# Patient Record
Sex: FEMALE | Race: BLACK OR AFRICAN AMERICAN | Employment: PART TIME | URBAN - METROPOLITAN AREA
[De-identification: names, ages, dates, MRNs, and addresses within clinical notes are randomized per-mention and may not be internally consistent; named-entity substitution may affect disease eponyms.]

---

## 2019-02-18 ENCOUNTER — HOSPITAL ENCOUNTER (OUTPATIENT)
Dept: GENERAL RADIOLOGY | Age: 64
Discharge: HOME | End: 2019-02-18
Payer: MEDICARE

## 2019-02-18 DIAGNOSIS — Z78.0 POST-MENOPAUSAL: ICD-10-CM

## 2019-02-18 PROCEDURE — 77080 DXA BONE DENSITY AXIAL: CPT

## 2020-02-25 ENCOUNTER — HOSPITAL ENCOUNTER (OUTPATIENT)
Dept: NUTRITION | Age: 65
Discharge: HOME | End: 2020-02-25
Payer: MEDICARE

## 2020-02-25 PROCEDURE — 97802 MEDICAL NUTRITION INDIV IN: CPT

## 2020-02-25 NOTE — PROGRESS NOTES
[facility] - Outpatient Nutrition Services  [id number] [place] [address], [place] [address]  Phone: [phone] Fax: [phone]   Nutrition Assessment - Medical Nutrition Therapy   Outpatient Initial Evaluation         Patient Name: Ron Mukherjee : 1955   Treatment Diagnosis: DM2   Referral Source: Jennyfer Velasquez Start of Care [facility]): 2020     Gender: female Age: 75 y.o. Ht: 70 in Wt:  250 lb  kg   BMI: 35.9 BMR   Male  BMR Female 2315     Past Medical History:  High BP, DM, HLD     Pertinent Medications:   D, K, BP and cholesterol meds   Biochemical Data:   See paper chart     Assessment:   Pt in today on request. She has had many family issues in her recent past that have led to weight gain. Pt goes back and forth to [place] (2.5 hours) every other weekend to help care for her sister. Pt works as a caterer PT. She currently lives with her daughter Stefanie Calhoun and they share cooking responsibilities. Pt walks for exercise 5 days per week when the weather permits (usually not often in the winter). She does not test BS at home. Food & Nutrition: Pt currently eats 3 meals/day in addition to snacks and dessert. Pt typically drinks Mt. Dew (up to 5 cans/day), water (2 bottles), and fruit juice occasionally.   B: 2 eggs with grits, turkey cohen, and coffee with cream and sweetener (not sugar) or Frosted Flakes or waffle with turkey cohen or applesauce on days she is off work   L: chicken wings or pizza or salad or turkey sandwich with fries, or Castaneda's combo meal  D: salmon, grilled or fried chicken, hot dogs, or steak, with potatoes and veggies or fast food meal at times  Sn: chips, cookies, or candy bar  José Manuel: cheesecake with strawberries or mini blizzard from DQ     Estimated Needs   Kcals/  1600 Protein: 100 Carbs: 180 Fat: 53   day  g/day  g/day  g/day        percent: 25  45  30               Nutrition Diagnosis PES: Alt nutrition related lab values r/t dx of DM2 aeb A1C of 6. Obesity r/t excessive energy intake aeb BMI of 35.9. Nutrition Intervention &  Education: Discussed the Healthy Plate Method and appropriate portion sizes of different food groups. Explained the importance of combining carbohydrates with protein at meals and reviewed foods that contain each nutrient. Emphasized the importance of consistent meal time intervals throughout the day to improve metabolism. Explained calorie density and empty calories to assist pt with understanding portion sizes and limiting excess calories. Educated pt on the pathophysiology of Type II Diabetes and insulin resistance and the rationale for dietary modification and increased activity. Educated pt on all carbohydrates found in foods and encouraged no more than 40-50 g/meal and 15-20 g/snack. Decrease or replace sugary beverages. Handouts Provided: [x]  Carbohydrates  [x]  Protein  []  Non-starchy Vegatbles  []  Food Label  []  Meal and Snack Ideas  []  Food Journals [x]  Diabetes  []  Cholesterol  []  Sodium  []  Gen Nutr Guidelines  []  SBGM Guidelines  [x]  Others: snacks, non-starchy veggies   Information Reviewed with: Pt   Readiness to Change Stage: []  Pre-contemplative    []  Contemplative  [x]  Preparation               []  Action                  []  Maintenance   Potential Barriers to Learning: []  Decline in memory    []  Language barrier   []  Other:  []  Emotional                  []  Limited mobility  []  Lack of motivation     [] Vision, hearing or cognitive impairment   Expected Compliance: Fair      Nutritional Goal - To promote lifestyle changes to result in:    [x]  Weight loss  [x]  Improved diabetic control   [x]  Decreased cholesterol levels  [x]  Decreased blood pressure  []  Weight maintenance []  Preventing any interactions associated with food allergies  []  Adequate weight gain toward goal weight  []  Other:        Patient Goals:   1. Limit CHO's to 40-50 g/meal and 15-20 g/snack  2.  Have at least 1 serving of protein each time you eat (balanced meals)  3. Aim to decrease Mt.  Dew to 2 cans/day (1 with lunch and 1 with dinner)     Dietitian Signature: Beatriz Parekh MS, PARVEZ Date: 2/25/2020   Follow-up: 3/23/20 @ 10:30 Time: 11:36 AM

## 2024-04-23 ENCOUNTER — OFFICE VISIT (OUTPATIENT)
Age: 69
End: 2024-04-23

## 2024-04-23 VITALS — WEIGHT: 237 LBS | HEIGHT: 70 IN | BODY MASS INDEX: 33.93 KG/M2

## 2024-04-23 DIAGNOSIS — I10 PRIMARY HYPERTENSION: ICD-10-CM

## 2024-04-23 DIAGNOSIS — M54.16 LUMBAR BACK PAIN WITH RADICULOPATHY AFFECTING RIGHT LOWER EXTREMITY: ICD-10-CM

## 2024-04-23 DIAGNOSIS — M16.11 PRIMARY OSTEOARTHRITIS OF RIGHT HIP: Primary | ICD-10-CM

## 2024-04-23 RX ORDER — MELOXICAM 15 MG/1
15 TABLET ORAL DAILY
Qty: 30 TABLET | Refills: 2 | Status: SHIPPED | OUTPATIENT
Start: 2024-04-23 | End: 2024-07-22

## 2024-04-23 NOTE — PROGRESS NOTES
meloxicam (MOBIC) 15 MG tablet Take 1 tablet by mouth daily 30 tablet 2    amLODIPine (NORVASC) 2.5 MG tablet Take by mouth daily      aspirin 81 MG EC tablet Take by mouth daily      ergocalciferol (ERGOCALCIFEROL) 1.25 MG (45081 UT) capsule Take 50,000 Units by mouth      simvastatin (ZOCOR) 40 MG tablet Take by mouth       No current facility-administered medications for this visit.        Allergies   Allergen Reactions    Amoxicillin Other (See Comments)     Yeast infection    Erythromycin Other (See Comments)     dizziness       No past surgical history on file.    Social History     Socioeconomic History    Marital status:      Spouse name: Not on file    Number of children: Not on file    Years of education: Not on file    Highest education level: Not on file   Occupational History    Not on file   Tobacco Use    Smoking status: Never    Smokeless tobacco: Not on file   Substance and Sexual Activity    Alcohol use: No    Drug use: Not on file    Sexual activity: Not on file   Other Topics Concern    Not on file   Social History Narrative    Not on file     Social Determinants of Health     Financial Resource Strain: Not on file   Food Insecurity: Not on file   Transportation Needs: Not on file   Physical Activity: Not on file   Stress: Not on file   Social Connections: Not on file   Intimate Partner Violence: Not on file   Housing Stability: Not on file       REVIEW OF SYSTEMS:      Negative except for that stated above.     [unfilled]      Prescription medication management discussed with patient.     Electronically signed by: Shaneka Colon PA-C    Note: This note was completed using voice recognition software.  Any typographical/name errors or mistakes are unintentional.

## 2024-06-19 ENCOUNTER — OFFICE VISIT (OUTPATIENT)
Age: 69
End: 2024-06-19
Payer: MEDICARE

## 2024-06-19 VITALS
TEMPERATURE: 96.8 F | DIASTOLIC BLOOD PRESSURE: 74 MMHG | SYSTOLIC BLOOD PRESSURE: 123 MMHG | WEIGHT: 221.6 LBS | HEART RATE: 86 BPM | BODY MASS INDEX: 31.73 KG/M2 | OXYGEN SATURATION: 96 % | HEIGHT: 70 IN

## 2024-06-19 DIAGNOSIS — M47.816 LUMBAR FACET ARTHROPATHY: ICD-10-CM

## 2024-06-19 DIAGNOSIS — M51.26 HNP (HERNIATED NUCLEUS PULPOSUS), LUMBAR: ICD-10-CM

## 2024-06-19 DIAGNOSIS — M48.062 LUMBAR STENOSIS WITH NEUROGENIC CLAUDICATION: Primary | ICD-10-CM

## 2024-06-19 PROCEDURE — 99203 OFFICE O/P NEW LOW 30 MIN: CPT | Performed by: NURSE PRACTITIONER

## 2024-06-19 PROCEDURE — G8399 PT W/DXA RESULTS DOCUMENT: HCPCS | Performed by: NURSE PRACTITIONER

## 2024-06-19 PROCEDURE — 3017F COLORECTAL CA SCREEN DOC REV: CPT | Performed by: NURSE PRACTITIONER

## 2024-06-19 PROCEDURE — 1036F TOBACCO NON-USER: CPT | Performed by: NURSE PRACTITIONER

## 2024-06-19 PROCEDURE — G8427 DOCREV CUR MEDS BY ELIG CLIN: HCPCS | Performed by: NURSE PRACTITIONER

## 2024-06-19 PROCEDURE — 1123F ACP DISCUSS/DSCN MKR DOCD: CPT | Performed by: NURSE PRACTITIONER

## 2024-06-19 PROCEDURE — G8417 CALC BMI ABV UP PARAM F/U: HCPCS | Performed by: NURSE PRACTITIONER

## 2024-06-19 PROCEDURE — 1090F PRES/ABSN URINE INCON ASSESS: CPT | Performed by: NURSE PRACTITIONER

## 2024-06-19 RX ORDER — MONTELUKAST SODIUM 10 MG/1
10 TABLET ORAL NIGHTLY
COMMUNITY

## 2024-06-19 RX ORDER — HYDROCHLOROTHIAZIDE 25 MG/1
25 TABLET ORAL DAILY
COMMUNITY

## 2024-06-19 RX ORDER — GABAPENTIN 100 MG/1
100 CAPSULE ORAL
COMMUNITY

## 2024-06-19 RX ORDER — POTASSIUM CHLORIDE 750 MG/1
10 TABLET, EXTENDED RELEASE ORAL DAILY
COMMUNITY

## 2024-06-19 RX ORDER — LORATADINE 10 MG/1
10 CAPSULE, LIQUID FILLED ORAL DAILY
COMMUNITY

## 2024-06-19 RX ORDER — DAPAGLIFLOZIN 10 MG/1
10 TABLET, FILM COATED ORAL EVERY MORNING
COMMUNITY

## 2024-06-19 NOTE — PROGRESS NOTES
Joselin Reyes presents today for   Chief Complaint   Patient presents with    Back Pain     LBP   Right hip, RLE pain to foot       Is someone accompanying this pt? NO    Is the patient using any DME equipment during OV? Yes, Walker          Coordination of Care:  1. Have you been to the ER, urgent care clinic since your last visit? No  Hospitalized since your last visit? No    2. Have you seen or consulted any other health care providers outside of the [facility] since your last visit? No Include any pap smears or colon screening. No

## 2024-06-19 NOTE — PROGRESS NOTES
Chief complaint   Chief Complaint   Patient presents with    Back Pain     LBP   Right hip, RLE pain to foot       History of Present Illness:  Joselin Reyes is a  68 y.o.  female who comes in today as a new patient referred by Ortho.  She states she has right-sided back pain with right buttock pain that radiates down the left foot for about a month now.  She denies any injury.  She gets numbness in that foot.  She describes the pain as sharp and shooting and stabbing.  She has tried Mobic but it gave her nausea and it did not help.  She has she has tried Lidoderm patches but that also did not help.  She is on gabapentin 100 mg 3 times a day through her PCP.  She had a MRI of the lumbar spine May 5, 2024 that showed posterior central disc extrusion with facet spurring at L4-5 and L5-S1.  At L5-S1 she had moderate right foraminal narrowing and more severe on the left.  She is diabetic and states her last A1c was 6.0.  She denies fever bowel bladder dysfunction.      Past Medical History:    Asthma, hyperlipidemia, depression and anxiety, GERD, diabetes, right breast cancer status post right lumpectomy and radiation    Past Surgical History:    Right lumpectomy, bilateral tubal ligation      Physical Exam: Patient is a 68-year-old female well-developed well-nourished who is alert and oriented with a normal mood and affect.  She has a full weightbearing slow but nonantalgic gait utilizing a rolling walker.  She has 4 out of 5 strength bilateral lower extremities.  Negative straight leg raise.  She has pain with hyperextension lumbar spine.      Assessment and Plan: This is a patient who has disc extrusion with some stenosis.  I will set her up for a right L5, S1 selective nerve root block.  She will continue her gabapentin through her PCP.  We will see her back after the block.      Joselin was seen today for back pain.    Diagnoses and all orders for this visit:    Lumbar stenosis with neurogenic claudication  -

## 2024-07-02 ENCOUNTER — HOSPITAL ENCOUNTER (OUTPATIENT)
Facility: HOSPITAL | Age: 69
Discharge: HOME | End: 2024-07-05
Payer: MEDICARE

## 2024-07-02 VITALS
SYSTOLIC BLOOD PRESSURE: 116 MMHG | DIASTOLIC BLOOD PRESSURE: 70 MMHG | HEART RATE: 81 BPM | OXYGEN SATURATION: 100 % | TEMPERATURE: 98.1 F | RESPIRATION RATE: 16 BRPM

## 2024-07-02 LAB — GLUCOSE BLD STRIP.AUTO-MCNC: 186 MG/DL (ref 70–110)

## 2024-07-02 PROCEDURE — 6360000002 HC RX W HCPCS: Performed by: PHYSICAL MEDICINE & REHABILITATION

## 2024-07-02 PROCEDURE — 64484 NJX AA&/STRD TFRM EPI L/S EA: CPT | Performed by: PHYSICAL MEDICINE & REHABILITATION

## 2024-07-02 PROCEDURE — 82962 GLUCOSE BLOOD TEST: CPT

## 2024-07-02 PROCEDURE — 2500000003 HC RX 250 WO HCPCS: Performed by: PHYSICAL MEDICINE & REHABILITATION

## 2024-07-02 PROCEDURE — 64483 NJX AA&/STRD TFRM EPI L/S 1: CPT | Performed by: PHYSICAL MEDICINE & REHABILITATION

## 2024-07-02 PROCEDURE — 64483 NJX AA&/STRD TFRM EPI L/S 1: CPT

## 2024-07-02 PROCEDURE — 64484 NJX AA&/STRD TFRM EPI L/S EA: CPT

## 2024-07-02 PROCEDURE — 6360000004 HC RX CONTRAST MEDICATION: Performed by: PHYSICAL MEDICINE & REHABILITATION

## 2024-07-02 RX ORDER — DULAGLUTIDE 3 MG/.5ML
3 INJECTION, SOLUTION SUBCUTANEOUS WEEKLY
COMMUNITY

## 2024-07-02 RX ORDER — DIAZEPAM 5 MG/1
2.5 TABLET ORAL ONCE
Status: DISCONTINUED | OUTPATIENT
Start: 2024-07-02 | End: 2024-07-06 | Stop reason: HOSPADM

## 2024-07-02 RX ORDER — BUPROPION HYDROCHLORIDE 100 MG/1
100 TABLET ORAL DAILY
COMMUNITY

## 2024-07-02 RX ORDER — METHOCARBAMOL 500 MG/1
750 TABLET, FILM COATED ORAL PRN
COMMUNITY
Start: 2024-04-21

## 2024-07-02 RX ORDER — IOPAMIDOL 408 MG/ML
4 INJECTION, SOLUTION INTRATHECAL
Status: COMPLETED | OUTPATIENT
Start: 2024-07-02 | End: 2024-07-02

## 2024-07-02 RX ORDER — EPINEPHRINE 0.3 MG/.3ML
0.3 INJECTION SUBCUTANEOUS PRN
COMMUNITY
Start: 2024-04-22

## 2024-07-02 RX ORDER — POTASSIUM CHLORIDE 750 MG/1
10 CAPSULE, EXTENDED RELEASE ORAL DAILY
COMMUNITY

## 2024-07-02 RX ORDER — ONDANSETRON 4 MG/1
4 TABLET, FILM COATED ORAL EVERY 8 HOURS PRN
COMMUNITY
Start: 2024-05-13

## 2024-07-02 RX ORDER — LETROZOLE 2.5 MG/1
2.5 TABLET, FILM COATED ORAL DAILY
COMMUNITY
Start: 2023-04-13

## 2024-07-02 RX ORDER — DIAZEPAM 5 MG/1
10 TABLET ORAL ONCE
Status: DISCONTINUED | OUTPATIENT
Start: 2024-07-02 | End: 2024-07-06 | Stop reason: HOSPADM

## 2024-07-02 RX ORDER — DEXAMETHASONE SODIUM PHOSPHATE 10 MG/ML
10 INJECTION, SOLUTION INTRAMUSCULAR; INTRAVENOUS ONCE
Status: COMPLETED | OUTPATIENT
Start: 2024-07-02 | End: 2024-07-02

## 2024-07-02 RX ORDER — CARVEDILOL 25 MG/1
25 TABLET ORAL DAILY
COMMUNITY
Start: 2023-07-25

## 2024-07-02 RX ORDER — LIDOCAINE HYDROCHLORIDE 10 MG/ML
30 INJECTION, SOLUTION EPIDURAL; INFILTRATION; INTRACAUDAL; PERINEURAL ONCE
Status: COMPLETED | OUTPATIENT
Start: 2024-07-02 | End: 2024-07-02

## 2024-07-02 RX ORDER — DIAZEPAM 5 MG/1
5 TABLET ORAL ONCE
Status: DISCONTINUED | OUTPATIENT
Start: 2024-07-02 | End: 2024-07-06 | Stop reason: HOSPADM

## 2024-07-02 RX ORDER — ACETAMINOPHEN 500 MG
500 TABLET ORAL EVERY 6 HOURS PRN
COMMUNITY
Start: 2024-04-21

## 2024-07-02 RX ORDER — FLUTICASONE PROPIONATE AND SALMETEROL 250; 50 UG/1; UG/1
POWDER RESPIRATORY (INHALATION)
COMMUNITY

## 2024-07-02 RX ORDER — LIDOCAINE 50 MG/G
1 PATCH TOPICAL EVERY 24 HOURS
COMMUNITY
Start: 2024-04-21

## 2024-07-02 RX ORDER — AZILSARTAN KAMEDOXOMIL 80 MG/1
80 TABLET ORAL DAILY
COMMUNITY
Start: 2023-07-25

## 2024-07-02 RX ADMIN — IOPAMIDOL 2 ML: 408 INJECTION, SOLUTION INTRATHECAL at 08:37

## 2024-07-02 RX ADMIN — LIDOCAINE HYDROCHLORIDE 15 ML: 10 INJECTION, SOLUTION EPIDURAL; INFILTRATION; INTRACAUDAL; PERINEURAL at 08:33

## 2024-07-02 RX ADMIN — DEXAMETHASONE SODIUM PHOSPHATE 10 MG: 10 INJECTION, SOLUTION INTRAMUSCULAR; INTRAVENOUS at 08:37

## 2024-07-02 ASSESSMENT — PAIN - FUNCTIONAL ASSESSMENT: PAIN_FUNCTIONAL_ASSESSMENT: 0-10

## 2024-07-02 ASSESSMENT — PAIN SCALES - GENERAL: PAINLEVEL_OUTOF10: 3

## 2024-07-02 NOTE — PROCEDURES
SELECTIVE NERVE ROOT BLOCK PROCEDURE NOTE      Patient Name: Joselin Reyes  Date of Procedure: July 2, 2024  Preoperative Diagnosis:  Lumbar Radicular Pain  Post Operative Diagnosis:  Lumbar Radicular Pain  Location:  [facility], [place], [place]    Procedure :    right L5 Selective Nerve Root Block  right S1 Selective Nerve Root Block    Consent:  Informed consent was obtained prior to the procedure.  The patient was given the opportunity to ask questions regarding the procedure and its associated risks.  In addition to the potential risks associated with the procedure itself, the patient was informed both verbally and in writing of the potential side effects of the use of glucocorticoid.  The patient appeared to comprehend the informed consent and desired to have the procedure performed.        Procedure:  The patient was placed in the prone position on the fluoroscopy table and the back was prepped and draped in the usual sterile manner.  The exact spinal level was  identified using fluoroscopy, and Lidocaine 1 % was injected locally, a 22 gauge spinal needle was passed to the transverse process.  The depth was noted and the needle redirected to pass inferior and approximately one cm anterior to the transverse process.    Yes  about 1 cc of Isovue M-200 was used to verify positioning in the epidural and paravertebral space and outlined the course of the spinal nerve into the epidural space.  The same procedure was repeated at each spinal level indicated above. No vascular uptake was identified.    A total of 10 mg of preservative free Dexamethasone and 1 cc of Lidocaine/site was slowly injected.       The patient tolerated the procedure well.  The injection area was cleaned and bandaids applied.  Not excessive bleeding was noted.   Patient dressed and discharged to home with instructions.    Discussion: The patient tolerated the procedure well. Patient reported gio-procedural pain on Visual

## 2024-07-02 NOTE — INTERVAL H&P NOTE
Update History & Physical    The patient's History and Physical of June 19, 2024 was reviewed. There was no change. The surgical site was confirmed by the patient and me.     Plan: The risks, benefits, expected outcome, and alternative to the recommended procedure have been discussed with the patient. Patient understands and wants to proceed with the procedure.     Electronically signed by DANIEL SOUZA MD on 7/2/2024 at 8:25 AM

## 2024-08-06 ENCOUNTER — OFFICE VISIT (OUTPATIENT)
Age: 69
End: 2024-08-06
Payer: MEDICARE

## 2024-08-06 VITALS
BODY MASS INDEX: 30.49 KG/M2 | WEIGHT: 213 LBS | OXYGEN SATURATION: 96 % | TEMPERATURE: 97.8 F | HEIGHT: 70 IN | HEART RATE: 83 BPM | DIASTOLIC BLOOD PRESSURE: 72 MMHG | SYSTOLIC BLOOD PRESSURE: 114 MMHG

## 2024-08-06 DIAGNOSIS — E11.42 DIABETIC PERIPHERAL NEUROPATHY (HCC): ICD-10-CM

## 2024-08-06 DIAGNOSIS — M51.26 HNP (HERNIATED NUCLEUS PULPOSUS), LUMBAR: Primary | ICD-10-CM

## 2024-08-06 DIAGNOSIS — M48.062 LUMBAR STENOSIS WITH NEUROGENIC CLAUDICATION: ICD-10-CM

## 2024-08-06 DIAGNOSIS — M47.816 LUMBAR FACET ARTHROPATHY: ICD-10-CM

## 2024-08-06 PROCEDURE — G8399 PT W/DXA RESULTS DOCUMENT: HCPCS | Performed by: NURSE PRACTITIONER

## 2024-08-06 PROCEDURE — 99214 OFFICE O/P EST MOD 30 MIN: CPT | Performed by: NURSE PRACTITIONER

## 2024-08-06 PROCEDURE — 3046F HEMOGLOBIN A1C LEVEL >9.0%: CPT | Performed by: NURSE PRACTITIONER

## 2024-08-06 PROCEDURE — G8427 DOCREV CUR MEDS BY ELIG CLIN: HCPCS | Performed by: NURSE PRACTITIONER

## 2024-08-06 PROCEDURE — 1090F PRES/ABSN URINE INCON ASSESS: CPT | Performed by: NURSE PRACTITIONER

## 2024-08-06 PROCEDURE — 2022F DILAT RTA XM EVC RTNOPTHY: CPT | Performed by: NURSE PRACTITIONER

## 2024-08-06 PROCEDURE — G8417 CALC BMI ABV UP PARAM F/U: HCPCS | Performed by: NURSE PRACTITIONER

## 2024-08-06 PROCEDURE — 1123F ACP DISCUSS/DSCN MKR DOCD: CPT | Performed by: NURSE PRACTITIONER

## 2024-08-06 PROCEDURE — 3017F COLORECTAL CA SCREEN DOC REV: CPT | Performed by: NURSE PRACTITIONER

## 2024-08-06 PROCEDURE — 1036F TOBACCO NON-USER: CPT | Performed by: NURSE PRACTITIONER

## 2024-08-06 RX ORDER — PREGABALIN 75 MG/1
75 CAPSULE ORAL 2 TIMES DAILY
Qty: 60 CAPSULE | Refills: 1 | Status: SHIPPED | OUTPATIENT
Start: 2024-08-06 | End: 2024-10-05

## 2024-08-06 RX ORDER — GABAPENTIN 600 MG/1
TABLET ORAL
COMMUNITY
Start: 2024-07-31

## 2024-08-06 NOTE — PROGRESS NOTES
Chief complaint   Chief Complaint   Patient presents with    Follow-up     LUMBAR STENOSIS       History of Present Illness:  Joselin Reyes is a  68 y.o.  female who comes in a day for follow-up of the right L5, S1 selective nerve block that she had done on July 2, 2024.  She states that totally resolved her right-sided sciatica from her buttock to her foot.  She states she does still get burning and numbness in that right foot and occasionally in the left.  She is on gabapentin 100 mg 3 times a day through her PCP, but states that is not really helping.  She had a MRI of the lumbar spine May 5, 2024 that showed posterior central disc extrusion with facet spurring at L4-5 and L5-S1.  At L5-S1 she had moderate right foraminal narrowing and more severe on the left.  She is diabetic and states her blood sugar runs in the 180s. She denies fever bowel bladder dysfunction.     Physical Exam: The patient is a 68-year-old female who is well-developed well-nourished who is alert and oriented with a normal mood and affect.  She has a full weightbearing slow but nonantalgic gait utilizing a rolling walker.  She has 4 out of 5 strength bilateral lower extremities.  Negative straight leg raise.  She has pain with hyperextension lumbar spine.            Assessment and Plan:   This is a patient who has disc extrusion with some stenosis.  The right L5, S1 selective nerve root block has resolved her sciatica but she still has some burning or numbness in that right foot.  I will discontinue the gabapentin and put her on Lyrica 75 mg twice a day.  I did explain to her this is a starting dose.  I will see her back in 6 weeks for reevaluation.         Joselin was seen today for follow-up.    Diagnoses and all orders for this visit:    HNP (herniated nucleus pulposus), lumbar  -     pregabalin (LYRICA) 75 MG capsule; Take 1 capsule by mouth 2 times daily for 60 days. Max Daily Amount: 150 mg    Lumbar stenosis with neurogenic claudication  -

## 2024-08-06 NOTE — PROGRESS NOTES
Joselin Reyes presents today for   Chief Complaint   Patient presents with    Follow-up     LUMBAR STENOSIS       Is someone accompanying this pt? NO    Is the patient using any DME equipment during OV? NO    Depression Screening:       No data to display                   No data to display                Learning Assessment:  No question data found.    Abuse Screening:       No data to display                Fall Risk       No data to display                OPIOID RISK TOOL       No data to display                Coordination of Care:  1. Have you been to the ER, urgent care clinic since your last visit? NO  Hospitalized since your last visit? NO    2. Have you seen or consulted any other health care providers outside of the [facility] since your last visit? NO Include any pap smears or colon screening. NO

## 2024-10-17 ENCOUNTER — HOSPITAL ENCOUNTER (OUTPATIENT)
Facility: HOSPITAL | Age: 69
Setting detail: RECURRING SERIES
Discharge: HOME | End: 2024-10-20
Payer: MEDICARE

## 2024-10-17 PROCEDURE — 97163 PT EVAL HIGH COMPLEX 45 MIN: CPT

## 2024-10-17 PROCEDURE — 97112 NEUROMUSCULAR REEDUCATION: CPT

## 2024-10-17 NOTE — PROGRESS NOTES
T DAILY TREATMENT NOTE/VESTIBULAR EVAL     Patient Name: Joselin Reyes    Date: 10/17/2024    : 1955  Insurance: Payor: MEDICARE / Plan: MEDICARE PART A AND B / Product Type: *No Product type* /      Patient  verified yes     Visit #   Current / Total 1 8-24   Time   In / Out 8:04 8:41   Pain   In / Out 0/10 0/10   Subjective Functional Status/Changes: See below     Treatment Area: Vertigo [R42]    SUBJECTIVE      Current symptoms/Complaints: Patient states she has had dizziness for the past month. States she noticed the dizziness while walking. She describes the dizziness as a lightheadedness that comes and goes. She states it can come when she is walking or sitting still. States she was given a medication to take every 12 hours but she cannot remember the name of the medication. States she has swelling and neuropathy in her right ankle - she is receiving PT for this at another clinic.     PMH: asthma, DM, h/o breast CA s/p lumpectomy      OBJECTIVE/EXAMINATION    Mobility: [x] No assistive device [] RW [] Rollator [] Hemiwalker [] LBQC [] SBQC [] SPC [] Other:        29 min [x]Eval    - untimed        Therapeutic Procedures:  Tx Min Billable or 1:1 Min (if diff from Tx Min) Procedure, Rationale, Specifics   8  94947 Neuromuscular Re-Education (timed):  improve balance, coordination, kinesthetic sense, posture, core stability and proprioception to improve patient's ability to develop conscious control of individual muscles and awareness of position of extremities in order to progress to PLOF and address remaining functional goals. (see flow sheet as applicable)     Details if applicable:     Total  8 Total Reminder: MC/BC bill using total billable min of TIMED therapeutic procedures (example: do not include dry needle or estim unattended, both untimed codes, in totals to left)     [x]  Patient Education billed concurrently with other procedures     Other Objective/Functional Measures:     Physical 
transfer abilities    Treatment Plan may include any combination of the followin Therapeutic Exercise, 27350 Neuromuscular Re-Education, 96442 Manual Therapy, 97844 Therapeutic Activity, 18654 Self Care/Home Management, and 53693 Gait Training  Patient / Family readiness to learn indicated by: asking questions, trying to perform skills, interest, return verbalization , and return demonstration   Persons(s) to be included in education: patient (P)  Barriers to Learning/Limitations: none  Measures taken if barriers to learning present: NA  Patient Goal (s): \"get rid of dizziness\"  Patient Self Reported Health Status: good  Rehabilitation Potential: good    Short Term Goals: To be accomplished in 4-6 weeks   Patient will be compliant with home exercise program (HEP) to promote gains with therapy.  Status at IE: issued VOR x 1 sitting for HEP  2.  Patient will score > 16/30 on the Functional Gait Assessment to decrease fall risk.  Status at IE: /  3. Patient will score > 40/56 on the Garcia Balance Scale to decrease fall risk.  Status at IE: 37/56    Long Term Goals: To be accomplished in 6-12 weeks   Patient will be independent with his/her home exercise program (HEP) to maintain gains of therapy.  Status at IE: issued VOR x 1 sitting for HEP  2.  Patient will score > 18/30 on the Functional Gait Assessment to decrease fall risk.  Status at IE: /30  3.  Patient will score > 43/56 on the Garcia Balance Scale to decrease fall risk.  Status at IE: 37/56  4.  Patient will demonstrate gait speed of > 0.8 m/s to decrease fall risk.  Status at IE: 0.6 m/s    Frequency / Duration: Patient would benefit from skilled PT 1-2 times per week for 24 VISITS sessions as needed in this certification period.  Goals will be assigned and reassessed every 10 visits/ 30 days per Medicare guidelines    Patient/ Caregiver education and instruction: Diagnosis, prognosis, self care and exercises [x]  Plan of care has been reviewed

## 2024-10-31 ENCOUNTER — HOSPITAL ENCOUNTER (OUTPATIENT)
Facility: HOSPITAL | Age: 69
Setting detail: RECURRING SERIES
End: 2024-10-31
Payer: MEDICARE